# Patient Record
Sex: MALE | Employment: STUDENT | URBAN - METROPOLITAN AREA
[De-identification: names, ages, dates, MRNs, and addresses within clinical notes are randomized per-mention and may not be internally consistent; named-entity substitution may affect disease eponyms.]

---

## 2022-12-30 ENCOUNTER — OFFICE VISIT (OUTPATIENT)
Dept: ORTHOPEDIC SURGERY | Age: 22
End: 2022-12-30
Payer: COMMERCIAL

## 2022-12-30 DIAGNOSIS — S50.02XA CONTUSION OF LEFT ELBOW, INITIAL ENCOUNTER: ICD-10-CM

## 2022-12-30 DIAGNOSIS — M25.522 LEFT ELBOW PAIN: Primary | ICD-10-CM

## 2022-12-30 PROCEDURE — 99203 OFFICE O/P NEW LOW 30 MIN: CPT | Performed by: ORTHOPAEDIC SURGERY

## 2022-12-30 NOTE — PROGRESS NOTES
ASSESSMENT/PLAN:  Below is the assessment and plan developed based on review of pertinent history, physical exam, labs, studies, and medications. 1. Left elbow pain  -     XR ELBOW LT AP/LAT; Future  2. Contusion of left elbow, initial encounter    Return if symptoms worsen or fail to improve. In discussion with the patient, we considered the numerus possible diagnoses that could be contributing to their present symptoms. We also deliberated on the extensive management options that must be considered to treat their current condition. We reviewed their accessible prior medical records, diagnostic tests, and current health and employment information. We considered how these symptoms were affecting the patient´s activities of daily living as well as employment and fitness activities. The patient had various questions regarding the possible risks, benefits, complications, morbidity and mortality regarding their diagnosis and treatment options. The patients´ comorbidities were considered, and I advocated that they consider maximizing lifestyle modification through nutrition and exercise to aid in addressing their symptoms. Shared decision making yielded an understanding to move forward with conservation treatment preferences. The patient expressed understanding that if conservative management fails to alleviate the present symptoms they will return to office for re-evaluation and consideration of additional diagnostic tests and potential surgical options. In the interim, we have recommended ice, elevation, and take prescription anti-inflammatory medications along with a physician directed home exercise program. We discussed the risks and common side effects of anti-inflammatory medications and instructed the patient to discontinue the medication and contact us if they experienced any side effects.  The patient was encouraged to discuss the possible side effects with their family physician or pharmacist prior to initiating any new medications. We discussed the fact that many of the recommended treatment options presented are significantly limited by the patient´s social determinants of health. We also reviewed the circumstances surrounding the environment that they live and work which affect a wide range of health risk. We considered the limited access to appropriate educational resources regarding proper nutrition and exercise as well as the economic and social support necessary to maintain health and wellbeing. We talked about the fact that he had contused his elbow and he had an abrasion over the olecranon bursa. We talked about the signs and symptoms of infection. I did recommend that he cover the abrasion. He can certainly return to the gym and workout as tolerated. Happy to see him back in the future if it does not continue to improve. SUBJECTIVE/OBJECTIVE:  Hank Chery (: 2000) is a 23 y.o. male, patient,here for evaluation of the Elbow Pain (2022 Fall Elbow Pain)  . 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 He reports that he fell the day after Rajendra. He reports he was wearing socks on a slippery floor and came down directly in the elbow. He reports he was sore for a number days and did swell. He saw his primary care physician and they treated it conservatively. He denies any numbness or tingling erythema or warmth. Denies any fevers chills or night sweats. He denies any previous injuries to the elbow. He reports rest has made it better. He is refrain from going to the gym. PHYSICAL EXAM:    Examination left elbow reveals he has full range of motion. He does have slight swelling within the bursa. There is no erythema or warmth. He does have an abrasion over that area. Mild tenderness over the olecranon. Nontender over the radial head. No effusion.   His elbow is stable on exam.  He is neuro vastly intact distally and has good strength. IMAGIN views of the left elbow showed no evidence of displaced fracture. No Known Allergies    Current Outpatient Medications   Medication Sig    cholecalciferol (VITAMIN D3) 1,000 unit tablet Take  by mouth daily. No current facility-administered medications for this visit. Past Medical History:   Diagnosis Date    Asthma        No past surgical history on file. No family history on file. Social History     Socioeconomic History    Marital status: SINGLE     Spouse name: Not on file    Number of children: Not on file    Years of education: Not on file    Highest education level: Not on file   Occupational History    Not on file   Tobacco Use    Smoking status: Never    Smokeless tobacco: Not on file   Substance and Sexual Activity    Alcohol use: No    Drug use: No    Sexual activity: Never   Other Topics Concern    Not on file   Social History Narrative    Not on file     Social Determinants of Health     Financial Resource Strain: Not on file   Food Insecurity: Not on file   Transportation Needs: Not on file   Physical Activity: Not on file   Stress: Not on file   Social Connections: Not on file   Intimate Partner Violence: Not on file   Housing Stability: Not on file       Review of Systems    No flowsheet data found. Vitals: There were no vitals taken for this visit. There is no height or weight on file to calculate BMI. An electronic signature was used to authenticate this note.   -- Rian Savage MD

## 2025-04-21 ENCOUNTER — OFFICE VISIT (OUTPATIENT)
Age: 25
End: 2025-04-21
Payer: COMMERCIAL

## 2025-04-21 VITALS
RESPIRATION RATE: 16 BRPM | OXYGEN SATURATION: 98 % | BODY MASS INDEX: 26.6 KG/M2 | TEMPERATURE: 97.1 F | HEIGHT: 71 IN | HEART RATE: 66 BPM | WEIGHT: 190 LBS

## 2025-04-21 DIAGNOSIS — G43.E09 CHRONIC MIGRAINE WITH AURA, NOT INTRACTABLE, WITHOUT STATUS MIGRAINOSUS: Primary | ICD-10-CM

## 2025-04-21 DIAGNOSIS — M54.81 OCCIPITAL NEURALGIA OF RIGHT SIDE: ICD-10-CM

## 2025-04-21 DIAGNOSIS — R11.0 NAUSEA: ICD-10-CM

## 2025-04-21 PROCEDURE — G8419 CALC BMI OUT NRM PARAM NOF/U: HCPCS | Performed by: PSYCHIATRY & NEUROLOGY

## 2025-04-21 PROCEDURE — G8427 DOCREV CUR MEDS BY ELIG CLIN: HCPCS | Performed by: PSYCHIATRY & NEUROLOGY

## 2025-04-21 PROCEDURE — 99204 OFFICE O/P NEW MOD 45 MIN: CPT | Performed by: PSYCHIATRY & NEUROLOGY

## 2025-04-21 PROCEDURE — 1036F TOBACCO NON-USER: CPT | Performed by: PSYCHIATRY & NEUROLOGY

## 2025-04-21 RX ORDER — METHYLPHENIDATE HYDROCHLORIDE 20 MG/1
TABLET ORAL
COMMUNITY
Start: 2025-04-01

## 2025-04-21 RX ORDER — ONDANSETRON 4 MG/1
4 TABLET, ORALLY DISINTEGRATING ORAL EVERY 8 HOURS PRN
Qty: 21 TABLET | Refills: 0 | Status: SHIPPED | OUTPATIENT
Start: 2025-04-21

## 2025-04-21 RX ORDER — RIZATRIPTAN BENZOATE 10 MG/1
TABLET, ORALLY DISINTEGRATING ORAL
Qty: 9 TABLET | Refills: 5 | Status: SHIPPED | OUTPATIENT
Start: 2025-04-21

## 2025-04-21 NOTE — PATIENT INSTRUCTIONS
knot is on the other side of the door. (Or you can have someone hold one end of the loop to provide resistance.)  Stand or sit facing where you have placed the band. Hold one end of the band in each hand.  Hold your arms out in front of you. Adjust your hold on the band so you have some tension on it.  With your shoulders relaxed, pull the bands back, and move your shoulder blades toward each other. Your elbows will pass along your waist.  Slowly return to the starting position.  Repeat 8 to 12 times.  Overhead pull-down    Tie a knot in the middle of an exercise band. Put the band over the top of a door so that the knot is on the other side of the door. Then shut the door to keep the band in place.  Sit or stand with your back to the door. Hold one end of the band in each hand.  Start with your arms up and comfortably apart, elbows straight. There should be a slight tension in the band.  Slightly tuck your chin, and look straight ahead.  Keeping your back straight, slowly pull down and back, bending your elbows.  Stop where your hands are level with your chin, in a \"goalpost\" position.  Repeat 8 to 12 times.  Chest T stretch    Lie on your back with your knees bent and your feet about hip-width apart.  Tuck your chin, and relax your shoulders.  Reach your arms straight out to the sides. If you don't feel a mild stretch in your shoulders and across your chest, use a foam roll or a tightly rolled towel or blanket under your spine, from your tailbone to your head.  Relax in this position for at least 15 to 30 seconds. Continue to breathe normally and don't hold your breath.  Repeat 2 to 4 times.  As you get used to this stretch, keep adding a little more time until you are able to relax in this position for up to 5 minutes. When you can relax for at least 2 minutes, you only need to do the exercise 1 time per session.  Goalpost stretch    Lie on your back on the floor (or a firm surface) with your knees bent and your

## 2025-04-21 NOTE — PROGRESS NOTES
NEUROLOGY CLINIC NOTE    Patient ID:  Nnamdi Aviles  [id number]  24 y.o.  2000    Date of Consultation:  April 21, 2025    Reason for Consultation:  headaches    Chief Complaint   Patient presents with    New Patient     Patient reports he has not migraines in the last 30 days.  Patient states he has aura and not being able to see out of 1 eye, pain behind eyes and has nausea at time. Patient states he saw eye MD in 10/2024.         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     Current Outpatient Medications on File Prior to Visit   Medication Sig Dispense Refill    methylphenidate (RITALIN) 20 MG tablet TAKE 1 TABLET BY MOUTH THREE TIMES A DAY ON EMPTY STOMACH      vitamin D (CHOLECALCIFEROL) 25 MCG (1000 UT) TABS tablet Take by mouth daily       No current facility-administered medications on file prior to visit.       No Known Allergies     Social History     Tobacco Use    Smoking status: Never    Smokeless tobacco: Not on file   Substance Use Topics    Alcohol use: Yes     Comment: I probably drink like once every 2 months or month      Family History   Problem Relation Age of Onset    Alzheimer's Disease Maternal Grandfather         Subjective:      Nnamdi Aviles is a 24 y.o. male RH with ADHD and migraine headache who is here for further evaluation of his headaches.     Per patient he was previously being seen by Dr. Obrien (pediatric neurologist) for his headaches.  Condition ongoing since high school  Happens every couple of months  October 2024 he was concerned since he had 3 headaches in a 10 day period  Excedrin helped    Abrupt onset  Either eye but R>L  Sees spots or blurred vision or pulsing  Water and caffeine  Less than 30 minutes  Behind the eye and radiates to the occiput  Or up front  Sharp